# Patient Record
Sex: MALE | Race: WHITE
[De-identification: names, ages, dates, MRNs, and addresses within clinical notes are randomized per-mention and may not be internally consistent; named-entity substitution may affect disease eponyms.]

---

## 2019-06-15 ENCOUNTER — HOSPITAL ENCOUNTER (EMERGENCY)
Dept: HOSPITAL 12 - ER | Age: 24
Discharge: HOME | End: 2019-06-15
Payer: SELF-PAY

## 2019-06-15 VITALS — SYSTOLIC BLOOD PRESSURE: 115 MMHG | DIASTOLIC BLOOD PRESSURE: 77 MMHG

## 2019-06-15 VITALS — WEIGHT: 140 LBS | BODY MASS INDEX: 20.04 KG/M2 | HEIGHT: 70 IN

## 2019-06-15 DIAGNOSIS — Z88.8: ICD-10-CM

## 2019-06-15 DIAGNOSIS — F17.290: ICD-10-CM

## 2019-06-15 DIAGNOSIS — Z71.6: ICD-10-CM

## 2019-06-15 DIAGNOSIS — F15.10: ICD-10-CM

## 2019-06-15 DIAGNOSIS — F11.10: Primary | ICD-10-CM

## 2019-06-15 LAB
ALP SERPL-CCNC: 152 U/L (ref 50–136)
ALT SERPL W/O P-5'-P-CCNC: 147 U/L (ref 16–63)
AST SERPL-CCNC: 118 U/L (ref 15–37)
BASOPHILS # BLD AUTO: 0.1 K/UL (ref 0–8)
BASOPHILS NFR BLD AUTO: 0.7 % (ref 0–2)
BILIRUB DIRECT SERPL-MCNC: 0.2 MG/DL (ref 0–0.2)
BILIRUB SERPL-MCNC: 0.6 MG/DL (ref 0.2–1)
BUN SERPL-MCNC: 11 MG/DL (ref 7–18)
CHLORIDE SERPL-SCNC: 104 MMOL/L (ref 98–107)
CO2 SERPL-SCNC: 31 MMOL/L (ref 21–32)
CREAT SERPL-MCNC: 1 MG/DL (ref 0.6–1.3)
EOSINOPHIL # BLD AUTO: 0.3 K/UL (ref 0–0.7)
EOSINOPHIL NFR BLD AUTO: 4.4 % (ref 0–7)
ETHANOL SERPL-MCNC: < 3 MG/DL (ref 0–0)
GLUCOSE SERPL-MCNC: 134 MG/DL (ref 74–106)
HCT VFR BLD AUTO: 42.6 % (ref 36.7–47.1)
HGB BLD-MCNC: 14.2 G/DL (ref 12.5–16.3)
LYMPHOCYTES # BLD AUTO: 2.5 K/UL (ref 20–40)
LYMPHOCYTES NFR BLD AUTO: 31.9 % (ref 20.5–51.5)
MCH RBC QN AUTO: 28.3 UUG (ref 23.8–33.4)
MCHC RBC AUTO-ENTMCNC: 33 G/DL (ref 32.5–36.3)
MCV RBC AUTO: 84.9 FL (ref 73–96.2)
MONOCYTES # BLD AUTO: 0.6 K/UL (ref 2–10)
MONOCYTES NFR BLD AUTO: 8.2 % (ref 0–11)
NEUTROPHILS # BLD AUTO: 4.3 K/UL (ref 1.8–8.9)
NEUTROPHILS NFR BLD AUTO: 54.8 % (ref 38.5–71.5)
PLATELET # BLD AUTO: 344 K/UL (ref 152–348)
POTASSIUM SERPL-SCNC: 3.6 MMOL/L (ref 3.5–5.1)
RBC # BLD AUTO: 5.01 MIL/UL (ref 4.06–5.63)
WBC # BLD AUTO: 7.8 K/UL (ref 3.6–10.2)
WS STN SPEC: 7.3 G/DL (ref 6.4–8.2)

## 2019-06-15 PROCEDURE — 99283 EMERGENCY DEPT VISIT LOW MDM: CPT

## 2019-06-15 PROCEDURE — 99406 BEHAV CHNG SMOKING 3-10 MIN: CPT

## 2019-06-15 PROCEDURE — 80076 HEPATIC FUNCTION PANEL: CPT

## 2019-06-15 PROCEDURE — 36415 COLL VENOUS BLD VENIPUNCTURE: CPT

## 2019-06-15 PROCEDURE — G0480 DRUG TEST DEF 1-7 CLASSES: HCPCS

## 2019-06-15 PROCEDURE — 85025 COMPLETE CBC W/AUTO DIFF WBC: CPT

## 2019-06-15 PROCEDURE — A4663 DIALYSIS BLOOD PRESSURE CUFF: HCPCS

## 2019-06-15 PROCEDURE — 80048 BASIC METABOLIC PNL TOTAL CA: CPT

## 2019-06-15 NOTE — NUR
Patient discharged to home in stable conditon WITH REHAB STAFF MEMBER.  Written 
and verbal after care instructions given. 

Patient verbalizes understanding of instructions. WALKED OUT OF ER WITH NO 
DISTRESS NOTED

## 2019-06-15 NOTE — NUR
PATIENT BIB BY STAFF MEMBER FROM [facility] FOR MEDICAL CLEARANCE TO 
BE ADMITTED IN THE FACILITY. PATIENT WITH NO DISTRESS NOTED AND WITH NO 
COMPLAINTS. DR RAMSAY INTO EVAL PATIENT.

## 2019-07-19 ENCOUNTER — HOSPITAL ENCOUNTER (EMERGENCY)
Dept: HOSPITAL 12 - ER | Age: 24
Discharge: HOME | End: 2019-07-19
Payer: COMMERCIAL

## 2019-07-19 VITALS — HEIGHT: 71 IN | WEIGHT: 155 LBS | BODY MASS INDEX: 21.7 KG/M2

## 2019-07-19 VITALS — DIASTOLIC BLOOD PRESSURE: 80 MMHG | SYSTOLIC BLOOD PRESSURE: 133 MMHG

## 2019-07-19 DIAGNOSIS — F17.200: ICD-10-CM

## 2019-07-19 DIAGNOSIS — F12.10: ICD-10-CM

## 2019-07-19 DIAGNOSIS — Z04.6: Primary | ICD-10-CM

## 2019-07-19 DIAGNOSIS — Z88.8: ICD-10-CM

## 2019-07-19 LAB
ALP SERPL-CCNC: 104 U/L (ref 50–136)
ALT SERPL W/O P-5'-P-CCNC: 73 U/L (ref 16–63)
AST SERPL-CCNC: 28 U/L (ref 15–37)
BASOPHILS # BLD AUTO: 0 K/UL (ref 0–8)
BASOPHILS NFR BLD AUTO: 0.4 % (ref 0–2)
BILIRUB SERPL-MCNC: 0.5 MG/DL (ref 0.2–1)
BUN SERPL-MCNC: 8 MG/DL (ref 7–18)
CHLORIDE SERPL-SCNC: 105 MMOL/L (ref 98–107)
CO2 SERPL-SCNC: 26 MMOL/L (ref 21–32)
CREAT SERPL-MCNC: 0.8 MG/DL (ref 0.6–1.3)
EOSINOPHIL # BLD AUTO: 0 K/UL (ref 0–0.7)
EOSINOPHIL NFR BLD AUTO: 0.5 % (ref 0–7)
GLUCOSE SERPL-MCNC: 102 MG/DL (ref 74–106)
HCT VFR BLD AUTO: 42.6 % (ref 36.7–47.1)
HGB BLD-MCNC: 14.3 G/DL (ref 12.5–16.3)
LYMPHOCYTES # BLD AUTO: 2.6 K/UL (ref 20–40)
LYMPHOCYTES NFR BLD AUTO: 27.5 % (ref 20.5–51.5)
MCH RBC QN AUTO: 29.1 UUG (ref 23.8–33.4)
MCHC RBC AUTO-ENTMCNC: 34 G/DL (ref 32.5–36.3)
MCV RBC AUTO: 86.8 FL (ref 73–96.2)
MONOCYTES # BLD AUTO: 1 K/UL (ref 2–10)
MONOCYTES NFR BLD AUTO: 11 % (ref 0–11)
NEUTROPHILS # BLD AUTO: 5.7 K/UL (ref 1.8–8.9)
NEUTROPHILS NFR BLD AUTO: 60.6 % (ref 38.5–71.5)
PLATELET # BLD AUTO: 395 K/UL (ref 152–348)
POTASSIUM SERPL-SCNC: 4.3 MMOL/L (ref 3.5–5.1)
RBC # BLD AUTO: 4.9 MIL/UL (ref 4.06–5.63)
WBC # BLD AUTO: 9.4 K/UL (ref 3.6–10.2)
WS STN SPEC: 7.8 G/DL (ref 6.4–8.2)

## 2019-07-19 PROCEDURE — A4663 DIALYSIS BLOOD PRESSURE CUFF: HCPCS
